# Patient Record
Sex: MALE | Race: WHITE | NOT HISPANIC OR LATINO | URBAN - METROPOLITAN AREA
[De-identification: names, ages, dates, MRNs, and addresses within clinical notes are randomized per-mention and may not be internally consistent; named-entity substitution may affect disease eponyms.]

---

## 2017-05-23 ENCOUNTER — OFFICE VISIT - RIVER FALLS (OUTPATIENT)
Dept: FAMILY MEDICINE | Facility: CLINIC | Age: 57
End: 2017-05-23

## 2017-05-23 ASSESSMENT — MIFFLIN-ST. JEOR: SCORE: 1751.89

## 2018-05-16 ENCOUNTER — OFFICE VISIT - RIVER FALLS (OUTPATIENT)
Dept: FAMILY MEDICINE | Facility: CLINIC | Age: 58
End: 2018-05-16

## 2018-05-16 ASSESSMENT — MIFFLIN-ST. JEOR: SCORE: 1786.36

## 2019-05-13 ENCOUNTER — OFFICE VISIT - RIVER FALLS (OUTPATIENT)
Dept: FAMILY MEDICINE | Facility: CLINIC | Age: 59
End: 2019-05-13

## 2019-05-13 LAB
ALBUMIN UR-MCNC: NEGATIVE G/DL
APPEARANCE UR: CLEAR
BILIRUB UR QL STRIP: NEGATIVE
COLOR UR AUTO: YELLOW
GLUCOSE UR STRIP-MCNC: NEGATIVE MG/DL
HGB UR QL STRIP: NEGATIVE
KETONES UR STRIP-MCNC: NEGATIVE MG/DL
LEUKOCYTE ESTERASE UR QL STRIP: NEGATIVE
NITRATE UR QL: NEGATIVE
PH UR STRIP: 7 [PH]
SP GR UR STRIP: <1.005
UROBILINOGEN UR STRIP-MCNC: NORMAL MG/DL

## 2019-05-13 ASSESSMENT — MIFFLIN-ST. JEOR: SCORE: 1829

## 2020-04-28 ENCOUNTER — OFFICE VISIT - RIVER FALLS (OUTPATIENT)
Dept: FAMILY MEDICINE | Facility: CLINIC | Age: 60
End: 2020-04-28

## 2020-04-28 LAB
ALBUMIN UR-MCNC: NEGATIVE G/DL
BILIRUB UR QL STRIP: NEGATIVE
GLUCOSE UR STRIP-MCNC: NEGATIVE MG/DL
HGB UR QL STRIP: NEGATIVE
KETONES UR STRIP-MCNC: NEGATIVE MG/DL
LEUKOCYTE ESTERASE UR QL STRIP: NEGATIVE
NITRATE UR QL: NEGATIVE
PH UR STRIP: 5.5 [PH] (ref 5–8)
SP GR UR STRIP: 1.02 (ref 1–1.03)

## 2020-04-28 ASSESSMENT — MIFFLIN-ST. JEOR: SCORE: 1854.4

## 2020-04-29 ENCOUNTER — COMMUNICATION - RIVER FALLS (OUTPATIENT)
Dept: FAMILY MEDICINE | Facility: CLINIC | Age: 60
End: 2020-04-29

## 2022-02-11 VITALS
DIASTOLIC BLOOD PRESSURE: 78 MMHG | SYSTOLIC BLOOD PRESSURE: 128 MMHG | HEART RATE: 61 BPM | BODY MASS INDEX: 28.23 KG/M2 | WEIGHT: 213 LBS | HEIGHT: 73 IN

## 2022-02-11 VITALS
DIASTOLIC BLOOD PRESSURE: 78 MMHG | SYSTOLIC BLOOD PRESSURE: 130 MMHG | HEIGHT: 73 IN | BODY MASS INDEX: 26.98 KG/M2 | WEIGHT: 203.6 LBS | HEART RATE: 60 BPM

## 2022-02-11 VITALS
DIASTOLIC BLOOD PRESSURE: 76 MMHG | WEIGHT: 218.6 LBS | HEART RATE: 60 BPM | TEMPERATURE: 97.7 F | SYSTOLIC BLOOD PRESSURE: 106 MMHG | BODY MASS INDEX: 28.97 KG/M2 | OXYGEN SATURATION: 97 % | HEIGHT: 73 IN

## 2022-02-11 VITALS
HEIGHT: 73 IN | HEART RATE: 64 BPM | WEIGHT: 196 LBS | SYSTOLIC BLOOD PRESSURE: 118 MMHG | DIASTOLIC BLOOD PRESSURE: 64 MMHG | BODY MASS INDEX: 25.98 KG/M2

## 2022-02-16 NOTE — PROGRESS NOTES
Patient:   PEGGY SCHMITZ            MRN: [id number]            FIN: [id number]               Age:   59 years     Sex:  Male     :  1960   Associated Diagnoses:   's permit physical examination   Author:   Dio Saab MD      Impression and Plan   Diagnosis     's permit physical examination ([id number] Z02.4).     Course:  Progressing as expected.    Plan:  qualifies for one year certification (due to history of Atrial Fibrillation).    Orders     Orders   Charges:  9939D DOT Exam (Charge) (Order): Quantity: 1, 's permit physical examination.        Visit Information      Date of Service: 2019 09:20 am  Performing Location: [facility]  Encounter#: [id number]      Primary Care Provider (PCP):  Dio Saab MD    NPI# [id number]      Referring Provider:  Dio Saab MD# [id number]   Visit type:  Annual exam.    Accompanied by:  No one.    Source of history:  Self.    Referral source:  Self.    History limitation:  None.       Chief Complaint   Chief complaint discussed and confirmed correct.    Here for a DOT physcal.   2019 9:41 AM CDT    Pt here for DOT px        History of Present Illness             The patient presents for DOT Px.  The patient's general health status is described as good.  The patient's diet is described as balanced.  Exercise: occasional.  Associated symptoms consist of none.  Medical encounters: none.  Compliance problems: none.        Review of Systems   Constitutional:  Negative.    Eye:  Negative.    Ear/Nose/Mouth/Throat:  Negative.    Respiratory:  Negative.    Cardiovascular:  Negative.    Gastrointestinal:  Negative.    Genitourinary:  Negative.    Hematology/Lymphatics:  Negative.    Endocrine:  Negative.    Immunologic:  Negative.    Musculoskeletal:  Negative.    Integumentary:  Negative.    Neurologic:  Negative.    Psychiatric:  Negative.    All other systems reviewed and negative      Health Status   Allergies:   Performed a post void bladder scan. Pt has 106ml upon scan. On assessment pt is dribbling urine, incontinence pad put on patient to monitor. Per patient he has a implanted device for voiding. Notified JONH Villanueva, Instructed to recheck before end of shift and closely monitor.      Allergic Reactions (Selected)  No known allergies   Medications:  (Selected)   Documented Medications  Documented  metoprolol succinate: po, daily, 0 Refill(s), Type: Maintenance,    Medications          *denotes recorded medication          *metoprolol succinate: po, daily, 0 Refill(s).     Problem list:    All Problems  Atrial fibrillation / SNOMED CT 88081129 / Confirmed  Osteoarthritis, Knee / ICD-9-.96 / Confirmed      Histories   Past Medical History:    Active  Osteoarthritis, Knee (715.96)   Family History:       Procedure history:    Ablation operation for arrhythmia (SNOMED CT 578636669) in 2017 at 56 Years.  Herniorrhaphy (SNOMED CT 19542969) in 1965 at 5 Years.  Comments:  10/14/2010 10:45 AM CDT - Melvin , Mary  RIGHT   Social History:        Tobacco Assessment: Current            Current, Snuff      Physical Examination   Vital signs reviewed  and within acceptable limits    Vital Signs   5/13/2019 9:41 AM CDT Peripheral Pulse Rate 61 bpm    Systolic Blood Pressure 128 mmHg    Diastolic Blood Pressure 78 mmHg    Mean Arterial Pressure 95 mmHg      Measurements from flowsheet : Measurements   5/13/2019 9:41 AM CDT Height Measured - Standard 73.25 in    Weight Measured - Standard 213 lb    BSA 2.23 m2    Body Mass Index 27.91 kg/m2  HI      General:  Alert and oriented, No acute distress.    Eye:  Normal visual fields, visual fields >90 degrees in all planes of vision, Pupils are equal, round and reactive to light, Extraocular movements are intact, Normal conjunctiva.    HENT:  Normocephalic, Tympanic membranes are clear, Normal hearing, Oral mucosa is moist, No pharyngeal erythema, Normal hearing per whisper test (>6 feet).    Neck:  Supple, Non-tender, No carotid bruit, No jugular venous distention, No lymphadenopathy, No thyromegaly.    Respiratory:  Lungs are clear to auscultation, Respirations are non-labored, Breath sounds are equal, Symmetrical chest wall expansion.    Cardiovascular:   Normal rate, Regular rhythm, No murmur, No gallop, Good pulses equal in all extremities, Normal peripheral perfusion, No edema.    Gastrointestinal:  Soft, Non-tender, Non-distended, Normal bowel sounds, No organomegaly.    Genitourinary:  No hernia.    Lymphatics:  No lymphadenopathy neck, axilla, groin.    Musculoskeletal:  Normal range of motion, Normal strength, No tenderness, No swelling, No deformity, Normal gait.    Integumentary:  Warm, Dry, Pink, No rash.    Neurologic:  Normal sensory, Normal motor function, No focal deficits, Normal deep tendon reflexes.    Cognition and Speech:  Speech clear and coherent, Functional cognition intact.    Psychiatric:  Cooperative, Appropriate mood & affect, Normal judgment.       Review / Management   Results review:  UA Minor: Normal.

## 2022-02-16 NOTE — PROGRESS NOTES
Patient:   PEGGY SCHMITZ            MRN: [id number]            FIN: [id number]               Age:   58 years     Sex:  Male     :  1960   Associated Diagnoses:   None   Author:   Dio Saab MD      Impression and Plan   Diagnosis     DOT PHYSICAL.     Plan:  Patient qualified for one year recertification.    Orders     Orders   Charges:  9939D DOT Exam (Charge) (Order): Quantity: 1, 's permit physical examination.        Visit Information      Date of Service: 2018 10:17 am  Performing Location: [facility]  Encounter#: [id number]      Primary Care Provider (PCP):  Dio Saab MD    NPI# [id number]      Referring Provider:  Dio Saab MD    NPI# [id number]   Visit type:  Annual exam.    Accompanied by:  No one.    Source of history:  Self.    Referral source:  Self.    History limitation:  None.       Chief Complaint   Chief complaint discussed and confirmed correct.    Here for a DOT physcal.   2018 10:31 AM CDT   Pt here for DOT px        History of Present Illness             The patient presents for DOT Px.  The patient's general health status is described as good.  The patient's diet is described as balanced.  Exercise: occasional.  Associated symptoms consist of none.  Medical encounters: none.  Compliance problems: none.        Review of Systems   Constitutional:  Negative.    Eye:  Negative.    Ear/Nose/Mouth/Throat:  Negative.    Respiratory:  Negative.    Cardiovascular:  Negative.    Gastrointestinal:  Negative.    Genitourinary:  Negative.    Hematology/Lymphatics:  Negative.    Endocrine:  Negative.    Immunologic:  Negative.    Musculoskeletal:  Negative.    Integumentary:  Negative.    Neurologic:  Negative.    Psychiatric:  Negative.    All other systems reviewed and negative      Health Status   Allergies:    Allergic Reactions (Selected)  No known allergies   Medications:  (Selected)   Documented Medications  Documented  metoprolol succinate: po,  daily, 0 Refill(s), Type: Maintenance   Problem list:    All Problems  Atrial fibrillation / SNOMED CT 53981885 / Confirmed  Osteoarthritis, Knee / ICD-9-.96 / Confirmed      Histories   Past Medical History:    Active  Osteoarthritis, Knee (715.96)   Family History:       Procedure history:    Ablation operation for arrhythmia (SNOMED CT 732884064) in 2017 at 56 Years.  Herniorrhaphy (SNOMED CT 08411935) in 1965 at 5 Years.  Comments:  10/14/2010 10:45 AM - Melvin , Mary  RIGHT   Social History:        Tobacco Assessment: Current            Current, Snuff        Physical Examination   Vital signs reviewed  and within acceptable limits    Vital Signs   5/16/2018 10:31 AM CDT Peripheral Pulse Rate 60 bpm    Systolic Blood Pressure 130 mmHg    Diastolic Blood Pressure 78 mmHg    Mean Arterial Pressure 95 mmHg    BP Site Right arm      Measurements from flowsheet : Measurements   5/16/2018 10:31 AM CDT Height Measured - Standard 73.25 in    Weight Measured - Standard 203.6 lb    BSA 2.18 m2    Body Mass Index 26.68 kg/m2  HI      General:  Alert and oriented, No acute distress.    Eye:  Normal visual fields, visual fields >90 degrees in all planes of vision, Pupils are equal, round and reactive to light, Extraocular movements are intact, Normal conjunctiva.    HENT:  Normocephalic, Tympanic membranes are clear, Normal hearing, Oral mucosa is moist, No pharyngeal erythema, Normal hearing per whisper test (>6 feet).    Neck:  Supple, Non-tender, No carotid bruit, No jugular venous distention, No lymphadenopathy, No thyromegaly.    Respiratory:  Lungs are clear to auscultation, Respirations are non-labored, Breath sounds are equal, Symmetrical chest wall expansion.    Cardiovascular:  Normal rate, Regular rhythm, No murmur, No gallop, Good pulses equal in all extremities, Normal peripheral perfusion, No edema.    Gastrointestinal:  Soft, Non-tender, Non-distended, Normal bowel sounds, No organomegaly.     Genitourinary:  No hernia.    Lymphatics:  No lymphadenopathy neck, axilla, groin.    Musculoskeletal:  Normal range of motion, Normal strength, No tenderness, No swelling, No deformity, Normal gait.    Integumentary:  Warm, Dry, Pink, No rash.    Neurologic:  Normal sensory, Normal motor function, No focal deficits, Normal deep tendon reflexes.    Cognition and Speech:  Speech clear and coherent, Functional cognition intact.    Psychiatric:  Cooperative, Appropriate mood & affect, Normal judgment.

## 2022-02-16 NOTE — PROGRESS NOTES
Patient:   PEGGY SCHMITZ            MRN: [id number]            FIN: [id number]               Age:   60 years     Sex:  Male     :  1960   Associated Diagnoses:   None   Author:   Dio Saab MD      Impression and Plan   Diagnosis     DOT PHYSICAL.     Plan:  Patient qualified for one year recertification due to chronic AFib.    Orders     Orders   Charges:  9939D DOT Exam (Charge) (Order): Quantity: 1, Encounter for examination required by Department of Transportation (DOT).        Visit Information      Date of Service: 2020 01:51 pm  Performing Location: [facility]  Encounter#: [id number]      Primary Care Provider (PCP):  Dio Saab MD    NPI# [id number]      Referring Provider:  Dio Saab MD    NPI# [id number]   Visit type:  Annual exam.    Accompanied by:  No one.    Source of history:  Self.    Referral source:  Self.    History limitation:  None.       Chief Complaint   Chief complaint discussed and confirmed correct.    Here for a DOT physcal.   2020 1:55 PM CDT    DOT px        History of Present Illness             The patient presents for DOT Px.  The patient's general health status is described as good.  The patient's diet is described as balanced.  Exercise: occasional.  Associated symptoms consist of none.  Medical encounters: none.  Compliance problems: none.        Review of Systems   Constitutional:  Negative.    Eye:  Negative.    Ear/Nose/Mouth/Throat:  Negative.    Respiratory:  Negative.    Cardiovascular:  Negative.    Gastrointestinal:  Negative.    Genitourinary:  Negative.    Hematology/Lymphatics:  Negative.    Endocrine:  Negative.    Immunologic:  Negative.    Musculoskeletal:  Negative.    Integumentary:  Negative.    Neurologic:  Negative.    Psychiatric:  Negative.    All other systems reviewed and negative      Health Status   Allergies:    Allergic Reactions (Selected)  No known allergies   Medications:  (Selected)   Documented  Medications  Documented  metoprolol succinate: po, daily, 0 Refill(s), Type: Maintenance,    Medications          *denotes recorded medication          *metoprolol succinate: po, daily, 0 Refill(s).       Problem list:    All Problems  Atrial fibrillation / SNOMED CT 63653892 / Confirmed  Osteoarthritis, Knee / ICD-9-.96 / Confirmed      Histories   Past Medical History:    Active  Osteoarthritis, Knee (715.96)   Family History:       Procedure history:    Ablation operation for arrhythmia (SNOMED CT 739380790) in 2017 at 56 Years.  Herniorrhaphy (SNOMED CT 23181118) in 1965 at 5 Years.  Comments:  10/14/2010 10:45 AM CDT - Melvin , Mary  RIGHT   Social History:        Tobacco Assessment: Current            Current, Snuff        Physical Examination   Vital signs reviewed  and within acceptable limits    Vital Signs   4/28/2020 1:55 PM CDT Temperature Tympanic 97.7 DegF  LOW    Peripheral Pulse Rate 60 bpm    Pulse Site Radial artery    HR Method Manual    Systolic Blood Pressure 106 mmHg    Diastolic Blood Pressure 76 mmHg    Mean Arterial Pressure 86 mmHg    BP Site Right arm    BP Method Manual    Oxygen Saturation 97 %      Measurements from flowsheet : Measurements   4/28/2020 1:55 PM CDT Height Measured - Standard 73.25 in    Weight Measured - Standard 218.6 lb    BSA 2.26 m2    Body Mass Index 28.64 kg/m2  HI      General:  Alert and oriented, No acute distress.    Eye:  Normal visual fields, visual fields >90 degrees in all planes of vision, Pupils are equal, round and reactive to light, Extraocular movements are intact, Normal conjunctiva.    HENT:  Normocephalic, Tympanic membranes are clear, Normal hearing, Oral mucosa is moist, No pharyngeal erythema, Normal hearing per whisper test (>6 feet).    Neck:  Supple, Non-tender, No carotid bruit, No jugular venous distention, No lymphadenopathy, No thyromegaly.    Respiratory:  Lungs are clear to auscultation, Respirations are non-labored, Breath sounds  are equal, Symmetrical chest wall expansion.    Cardiovascular:  Normal rate, No murmur, No gallop, Good pulses equal in all extremities, Normal peripheral perfusion, No edema, Irregular irregular rythm.    Gastrointestinal:  Soft, Non-tender, Non-distended, Normal bowel sounds, No organomegaly.    Genitourinary:  No hernia.    Lymphatics:  No lymphadenopathy neck, axilla, groin.    Musculoskeletal:  Normal range of motion, Normal strength, No tenderness, No swelling, No deformity, Normal gait.    Integumentary:  Warm, Dry, Pink, No rash.    Neurologic:  Normal sensory, Normal motor function, No focal deficits, Normal deep tendon reflexes.    Cognition and Speech:  Speech clear and coherent, Functional cognition intact.    Psychiatric:  Cooperative, Appropriate mood & affect, Normal judgment.       Review / Management   Results review:  UA Minor: Normal.

## 2022-02-16 NOTE — TELEPHONE ENCOUNTER
---------------------  From: Amalia Madison RN (Phone Messages Pool ([id number]_[facility]))   To: Dio Saab MD;     Sent: 4/29/2020 11:17:20 AM CDT  Subject: Phone Message - DOT clarification     Phone Message    PCP:   AMADOR      Time of Call:  1113       Person Calling:  Pt  Phone number:  [phone]    Returned call at: _    Note:   Patient called regarding paperwork from DOT yesterday with AMADOR. He states he is at the [employer] and are needing clarification on medical examiner portion filled out by AMADOR. He is asking to speak directly to AMADOR to clarify information.     Last office visit and reason:

## 2022-02-16 NOTE — NURSING NOTE
Comprehensive Intake Entered On:  5/13/2019 9:42 AM CDT    Performed On:  5/13/2019 9:41 AM CDT by Leonor Arauz CMA               Summary   Chief Complaint :   Pt here for DOT px   Weight Measured :   213 lb(Converted to: 213 lb 0 oz, 96.62 kg)    Height Measured :   73.25 in(Converted to: 6 ft 1 in, 186.05 cm)    Body Mass Index :   27.91 kg/m2 (HI)    Body Surface Area :   2.23 m2   Systolic Blood Pressure :   128 mmHg   Diastolic Blood Pressure :   78 mmHg   Mean Arterial Pressure :   95 mmHg   Peripheral Pulse Rate :   61 bpm   Leonor Arauz CMA - 5/13/2019 9:41 AM CDT   Health Status   Allergies Verified? :   Yes   Medication History Verified? :   Yes   Medical History Verified? :   Yes   Pre-Visit Planning Status :   Not completed   Tobacco Use? :   Current every day smoker   Leonor Arauz CMA - 5/13/2019 9:41 AM CDT   Consents   Consent for Immunization Exchange :   Consent Granted   Consent for Immunizations to Providers :   Consent Granted   Leonor Arauz CMA - 5/13/2019 9:41 AM CDT   Meds / Allergies   (As Of: 5/13/2019 9:42:09 AM CDT)   Allergies (Active)   No known allergies  Estimated Onset Date:   Unspecified ; Created By:   Mary Melvin CMA; Reaction Status:   Active ; Category:   Drug ; Substance:   No known allergies ; Type:   Allergy ; Updated By:   Mary Melvin CMA; Reviewed Date:   5/13/2019 9:41 AM CDT        Medication List   (As Of: 5/13/2019 9:42:09 AM CDT)   Home Meds    metoprolol  :   metoprolol ; Status:   Documented ; Ordered As Mnemonic:   metoprolol succinate ; Simple Display Line:   po, daily, 0 Refill(s) ; Catalog Code:   metoprolol ; Order Dt/Tm:   5/23/2017 3:57:26 PM            Vision Testing POC   Corrective Lenses :   Glasses   Color Blind Correct Plates :   passed   Eye, Left Visual Acuity :   20/25   Eye, Right Visual Acuity :   20/25   Eye, Bilateral Visual Acuity :   20/25   Leonor Arauz CMA - 5/13/2019 9:41 AM CDT

## 2022-02-16 NOTE — NURSING NOTE
Urine Dipstick POC Entered On:  5/13/2019 11:14 AM CDT    Performed On:  5/13/2019 11:12 AM CDT by Beena Vigil               Urine Dipstick POC   Urine Color Urine Dipstick :   Yellow   Urine Appearance Urine Dipstick :   Clear   Glucose Urine Dipstick :   Negative   Bilirubin Urine Dipstick :   Negative   Ketones Urine Dipstick :   Negative   Specific Gravity Urine Dipstick :   < 1.005   Blood Urine Dipstick :   Negative   pH Urine Dipstick :   7   Protein Urine Dipstick :   Negative   Urobilinogen Urine Dipstick :   0.2 mg/dl   Nitrite Urine Dipstick :   Negative   Leukocytes Urine Dipstick :   Negative   POC Test Comments :   #751    Lab test performed by:  [facility]  [address] [place] [address]  Phone # [phone]  Fax # [phone]     Beena Vigil - 5/13/2019 11:12 AM CDT   Details   Collection Date :   5/13/2019 9:46 AM CDT   Handling Specimen POC :   midstream clean catch   Beena Vigil - 5/13/2019 11:12 AM CDT

## 2022-02-16 NOTE — NURSING NOTE
Comprehensive Intake Entered On:  4/28/2020 2:23 PM CDT    Performed On:  4/28/2020 1:55 PM CDT by Diann MCKNIGHT, Olinda               Summary   Chief Complaint :   DOT px   Weight Measured :   218.6 lb(Converted to: 218 lb 10 oz, 99.16 kg)    Height Measured :   73.25 in(Converted to: 6 ft 1 in, 186.05 cm)    Body Mass Index :   28.64 kg/m2 (HI)    Body Surface Area :   2.26 m2   Systolic Blood Pressure :   106 mmHg   Diastolic Blood Pressure :   76 mmHg   Mean Arterial Pressure :   86 mmHg   Peripheral Pulse Rate :   60 bpm   BP Site :   Right arm   Pulse Site :   Radial artery   BP Method :   Manual   HR Method :   Manual   Temperature Tympanic :   97.7 DegF(Converted to: 36.5 DegC)  (LOW)    Oxygen Saturation :   97 %   Olinda Tidwell MA - 4/28/2020 1:55 PM CDT   Health Status   Allergies Verified? :   Yes   Medication History Verified? :   Yes   Medical History Verified? :   Yes   Pre-Visit Planning Status :   Completed   Tobacco Use? :   Current every day smoker   Olinda Tidwell MA - 4/28/2020 1:55 PM CDT   Consents   Consent for Immunization Exchange :   Consent Granted   Consent for Immunizations to Providers :   Consent Granted   Olinda Tidwell MA - 4/28/2020 1:55 PM CDT   Meds / Allergies   (As Of: 4/28/2020 2:23:42 PM CDT)   Allergies (Active)   No known allergies  Estimated Onset Date:   Unspecified ; Created By:   Mary Melvin CMA; Reaction Status:   Active ; Category:   Drug ; Substance:   No known allergies ; Type:   Allergy ; Updated By:   Mary Melvin CMA; Reviewed Date:   5/13/2019 9:41 AM CDT        Medication List   (As Of: 4/28/2020 2:23:42 PM CDT)   Home Meds    metoprolol  :   metoprolol ; Status:   Documented ; Ordered As Mnemonic:   metoprolol succinate ; Simple Display Line:   po, daily, 0 Refill(s) ; Catalog Code:   metoprolol ; Order Dt/Tm:   5/23/2017 3:57:26 PM CDT            Vision Testing POC   Corrective Lenses :   Glasses   Color Blind Correct Plates :   normal   Eye, Left with  Correction Visual Acuity :   20/30   Eye, Right with Correction Visual Acuity :   20/25   Eye, Bilat w/Correction Visual Acuity :   20/20   Diann MCKNIGHT, Olinda - 4/28/2020 1:55 PM CDT   Social History   Social History   (As Of: 4/28/2020 2:23:42 PM CDT)   Tobacco:  Current      Current, Snuff   (Last Updated: 10/14/2010 10:44:40 AM CDT by Mary Melvin CMA)